# Patient Record
Sex: MALE | Race: WHITE | NOT HISPANIC OR LATINO | URBAN - METROPOLITAN AREA
[De-identification: names, ages, dates, MRNs, and addresses within clinical notes are randomized per-mention and may not be internally consistent; named-entity substitution may affect disease eponyms.]

---

## 2017-01-10 PROBLEM — Z00.00 ENCOUNTER FOR PREVENTIVE HEALTH EXAMINATION: Status: ACTIVE | Noted: 2017-01-10

## 2018-01-28 ENCOUNTER — EMERGENCY (EMERGENCY)
Facility: HOSPITAL | Age: 53
LOS: 1 days | End: 2018-01-28
Attending: EMERGENCY MEDICINE
Payer: COMMERCIAL

## 2018-01-28 VITALS
HEIGHT: 75 IN | SYSTOLIC BLOOD PRESSURE: 152 MMHG | OXYGEN SATURATION: 96 % | RESPIRATION RATE: 18 BRPM | HEART RATE: 69 BPM | DIASTOLIC BLOOD PRESSURE: 100 MMHG | WEIGHT: 285.06 LBS | TEMPERATURE: 98 F

## 2018-01-28 VITALS
TEMPERATURE: 98 F | HEART RATE: 68 BPM | DIASTOLIC BLOOD PRESSURE: 87 MMHG | SYSTOLIC BLOOD PRESSURE: 142 MMHG | OXYGEN SATURATION: 95 % | RESPIRATION RATE: 16 BRPM

## 2018-01-28 DIAGNOSIS — Z98.89 OTHER SPECIFIED POSTPROCEDURAL STATES: Chronic | ICD-10-CM

## 2018-01-28 DIAGNOSIS — Z96.659 PRESENCE OF UNSPECIFIED ARTIFICIAL KNEE JOINT: Chronic | ICD-10-CM

## 2018-01-28 PROCEDURE — 99283 EMERGENCY DEPT VISIT LOW MDM: CPT

## 2018-01-28 PROCEDURE — 99284 EMERGENCY DEPT VISIT MOD MDM: CPT

## 2018-01-28 RX ORDER — MUPIROCIN 20 MG/G
1 OINTMENT TOPICAL
Qty: 0 | Refills: 0 | COMMUNITY

## 2018-01-28 RX ORDER — VALACYCLOVIR 500 MG/1
1 TABLET, FILM COATED ORAL
Qty: 30 | Refills: 0 | OUTPATIENT
Start: 2018-01-28 | End: 2018-02-06

## 2018-01-28 RX ORDER — VALACYCLOVIR 500 MG/1
1000 TABLET, FILM COATED ORAL ONCE
Qty: 0 | Refills: 0 | Status: COMPLETED | OUTPATIENT
Start: 2018-01-28 | End: 2018-01-28

## 2018-01-28 RX ADMIN — VALACYCLOVIR 1000 MILLIGRAM(S): 500 TABLET, FILM COATED ORAL at 12:15

## 2018-01-28 NOTE — ED ADULT NURSE NOTE - OBJECTIVE STATEMENT
Pt is 52y M came to ED with c/o rash, blisters on the Right side of face, maxillary aspect, vesicle formation with some weeping, reports it began 5 days ago, denies fevers/chills, denies numbness/tingling, awaiting dispo, advised on plan of care, educated on use of antiviral medication.

## 2018-01-28 NOTE — ED PROVIDER NOTE - PMH
History of sleep apnea  not on cpap at present  Hyperlipidemia    Hypertension    KESHIA (obstructive sleep apnea)  mild-no cpap  Primary osteoarthritis of right knee

## 2018-01-28 NOTE — ED PROVIDER NOTE - PSH
History of right inguinal hernia  x2  S/P knee replacement  12/21/2015  S/P knee surgery  arthroscopy both knees, right knee 3x  ,left knee 3x  S/P UVPP (uvulopalatopharyngoplasty)

## 2018-01-28 NOTE — ED PROVIDER NOTE - MEDICAL DECISION MAKING DETAILS
Facial vesicular eruption now with eye irritation requiring evaluation, meds and Opthalmology consultation

## 2018-01-28 NOTE — ED PROVIDER NOTE - PROGRESS NOTE DETAILS
Case d/w O/C physician for Dr. Saavedra and wants Valtrex P.O only and will see patient tomorrow in office

## 2018-01-28 NOTE — ED PROVIDER NOTE - OBJECTIVE STATEMENT
51 yo white male presents today with redness and irritation to right eye. Patient states that over the past few days he has developed weeping lesions/vesicles to right cheek/nose area and was seen at [facility] office yesterday. Oral AUGMENTIN and Topical Bactroban was given. Eye irritation has worsened so presents today for evaluation. No blurred or altered vision and no headache/nausea or vomiting.

## 2020-02-12 ENCOUNTER — APPOINTMENT (OUTPATIENT)
Dept: CARDIOLOGY | Facility: CLINIC | Age: 55
End: 2020-02-12
Payer: COMMERCIAL

## 2020-02-12 ENCOUNTER — NON-APPOINTMENT (OUTPATIENT)
Age: 55
End: 2020-02-12

## 2020-02-12 VITALS
WEIGHT: 297 LBS | OXYGEN SATURATION: 94 % | BODY MASS INDEX: 36.93 KG/M2 | DIASTOLIC BLOOD PRESSURE: 83 MMHG | HEIGHT: 75 IN | HEART RATE: 81 BPM | SYSTOLIC BLOOD PRESSURE: 155 MMHG

## 2020-02-12 DIAGNOSIS — Z78.9 OTHER SPECIFIED HEALTH STATUS: ICD-10-CM

## 2020-02-12 DIAGNOSIS — R73.03 PREDIABETES.: ICD-10-CM

## 2020-02-12 DIAGNOSIS — R06.02 SHORTNESS OF BREATH: ICD-10-CM

## 2020-02-12 PROCEDURE — 93000 ELECTROCARDIOGRAM COMPLETE: CPT

## 2020-02-12 PROCEDURE — 99244 OFF/OP CNSLTJ NEW/EST MOD 40: CPT

## 2020-02-12 NOTE — PHYSICAL EXAM
[Normal Appearance] : normal appearance [General Appearance - Well Developed] : well developed [Well Groomed] : well groomed [General Appearance - Well Nourished] : well nourished [No Deformities] : no deformities [General Appearance - In No Acute Distress] : no acute distress [FreeTextEntry1] : overweight [Normal Conjunctiva] : the conjunctiva exhibited no abnormalities [Eyelids - No Xanthelasma] : the eyelids demonstrated no xanthelasmas [Normal Oral Mucosa] : normal oral mucosa [No Oral Pallor] : no oral pallor [No Oral Cyanosis] : no oral cyanosis [Normal Jugular Venous A Waves Present] : normal jugular venous A waves present [No Jugular Venous Murguia A Waves] : no jugular venous murguia A waves [Normal Jugular Venous V Waves Present] : normal jugular venous V waves present [Normal Rate] : normal [Normal S1] : normal S1 [S3] : no S3 [Normal S2] : normal S2 [S4] : no S4 [No Murmur] : no murmurs heard [Right Carotid Bruit] : no bruit heard over the right carotid [Left Carotid Bruit] : no bruit heard over the left carotid [Right Femoral Bruit] : no bruit heard over the right femoral artery [Left Femoral Bruit] : no bruit heard over the left femoral artery [2+] : left 2+ [No Abnormalities] : the abdominal aorta was not enlarged and no bruit was heard [No Pitting Edema] : no pitting edema present [Respiration, Rhythm And Depth] : normal respiratory rhythm and effort [Exaggerated Use Of Accessory Muscles For Inspiration] : no accessory muscle use [Auscultation Breath Sounds / Voice Sounds] : lungs were clear to auscultation bilaterally [Abdomen Soft] : soft [Abdomen Tenderness] : non-tender [Abdomen Mass (___ Cm)] : no abdominal mass palpated [Abnormal Walk] : normal gait [Gait - Sufficient For Exercise Testing] : the gait was sufficient for exercise testing [Nail Clubbing] : no clubbing of the fingernails [Cyanosis, Localized] : no localized cyanosis [Petechial Hemorrhages (___cm)] : no petechial hemorrhages [Skin Color & Pigmentation] : normal skin color and pigmentation [No Venous Stasis] : no venous stasis [] : no rash [No Skin Ulcers] : no skin ulcer [Skin Lesions] : no skin lesions [No Xanthoma] : no  xanthoma was observed [Affect] : the affect was normal [Oriented To Time, Place, And Person] : oriented to person, place, and time [No Anxiety] : not feeling anxious [Mood] : the mood was normal

## 2020-02-12 NOTE — DISCUSSION/SUMMARY
[FreeTextEntry1] : Neal is a 54 year old male here for evaluation.\par \par He reports 2 episodes of dizziness and difficulty catching his breath. His blood pressure is elevated today, and it may be responsible for his symptoms. His EKG demonstrates a sinus rhythm without obvious ischemia or chamber enlargement. His physical exam is unremarkable.\par \par In the setting of his symptoms, he will have a 2-D echocardiogram to evaluate for structural heart disease, and an exercise stress test to evaluate for exercise-induced ischemia, and his blood pressure response to exercise. If his blood pressures remain in this range, he will likely need pharmacologic treatment. I stressed the importance of diet, exercise, and weight loss, to reduce his overall cardiovascular risk. We will speak after the above tests, and arrange followup. [With Me] : with me

## 2020-02-12 NOTE — HISTORY OF PRESENT ILLNESS
[FreeTextEntry1] : Neal is a 54 year old male here for evaluation.\par \par He is generally healthy, though reports borderline blood pressures in the recent past. He works as a , and is not exceptionally active. That being said, he does exercise, and denies a decrease in exercise tolerance.\par \par He reports 2 episodes of dizziness, and difficulty catching his breath. One occurred on the football field, and another at home. He felt wobbly, and dizzy, though did not lose consciousness. He had no chest pain or significant palpitations during the episodes.\par \par He currently takes Saxenda of weight loss and borderline DM2. He is also on an SSRI for mild depression.

## 2020-02-24 ENCOUNTER — APPOINTMENT (OUTPATIENT)
Dept: CARDIOLOGY | Facility: CLINIC | Age: 55
End: 2020-02-24
Payer: COMMERCIAL

## 2020-02-24 PROCEDURE — 93306 TTE W/DOPPLER COMPLETE: CPT

## 2020-04-09 ENCOUNTER — APPOINTMENT (OUTPATIENT)
Dept: CARDIOLOGY | Facility: CLINIC | Age: 55
End: 2020-04-09

## 2020-08-03 ENCOUNTER — APPOINTMENT (OUTPATIENT)
Dept: CARDIOLOGY | Facility: CLINIC | Age: 55
End: 2020-08-03

## 2020-11-02 ENCOUNTER — APPOINTMENT (OUTPATIENT)
Dept: PSYCHIATRY | Facility: CLINIC | Age: 55
End: 2020-11-02
Payer: COMMERCIAL

## 2020-11-02 DIAGNOSIS — F41.9 ANXIETY DISORDER, UNSPECIFIED: ICD-10-CM

## 2020-11-02 DIAGNOSIS — Z86.79 PERSONAL HISTORY OF OTHER DISEASES OF THE CIRCULATORY SYSTEM: ICD-10-CM

## 2020-11-02 DIAGNOSIS — F43.10 POST-TRAUMATIC STRESS DISORDER, UNSPECIFIED: ICD-10-CM

## 2020-11-02 DIAGNOSIS — F32.9 MAJOR DEPRESSIVE DISORDER, SINGLE EPISODE, UNSPECIFIED: ICD-10-CM

## 2020-11-02 DIAGNOSIS — Z86.39 PERSONAL HISTORY OF OTHER ENDOCRINE, NUTRITIONAL AND METABOLIC DISEASE: ICD-10-CM

## 2020-11-02 PROCEDURE — 99072 ADDL SUPL MATRL&STAF TM PHE: CPT

## 2020-11-02 PROCEDURE — 99205 OFFICE O/P NEW HI 60 MIN: CPT

## 2020-11-02 RX ORDER — ATORVASTATIN CALCIUM 20 MG/1
20 TABLET, FILM COATED ORAL
Qty: 30 | Refills: 0 | Status: ACTIVE | COMMUNITY
Start: 2020-08-05

## 2020-11-02 RX ORDER — METFORMIN HYDROCHLORIDE 500 MG/1
500 TABLET, COATED ORAL
Qty: 90 | Refills: 0 | Status: DISCONTINUED | COMMUNITY
Start: 2020-08-11

## 2020-11-02 RX ORDER — LIRAGLUTIDE 6 MG/ML
18 INJECTION, SOLUTION SUBCUTANEOUS
Refills: 0 | Status: COMPLETED | COMMUNITY
End: 2020-11-02

## 2020-11-02 RX ORDER — SERTRALINE HYDROCHLORIDE 100 MG/1
100 TABLET, FILM COATED ORAL
Qty: 90 | Refills: 0 | Status: DISCONTINUED | COMMUNITY
Start: 2020-08-05

## 2020-12-02 ENCOUNTER — APPOINTMENT (OUTPATIENT)
Dept: PSYCHIATRY | Facility: CLINIC | Age: 55
End: 2020-12-02
Payer: COMMERCIAL

## 2020-12-02 PROCEDURE — 99214 OFFICE O/P EST MOD 30 MIN: CPT | Mod: 95

## 2021-01-15 ENCOUNTER — APPOINTMENT (OUTPATIENT)
Dept: PSYCHIATRY | Facility: CLINIC | Age: 56
End: 2021-01-15
Payer: COMMERCIAL

## 2021-01-15 PROCEDURE — 99214 OFFICE O/P EST MOD 30 MIN: CPT | Mod: 95

## 2021-01-15 RX ORDER — BUPROPION HYDROCHLORIDE 75 MG/1
75 TABLET, FILM COATED ORAL
Qty: 10 | Refills: 0 | Status: COMPLETED | COMMUNITY
Start: 2020-12-02 | End: 2021-01-15

## 2021-02-26 ENCOUNTER — APPOINTMENT (OUTPATIENT)
Dept: PSYCHIATRY | Facility: CLINIC | Age: 56
End: 2021-02-26
Payer: COMMERCIAL

## 2021-02-26 PROCEDURE — 99214 OFFICE O/P EST MOD 30 MIN: CPT | Mod: 95

## 2021-04-18 ENCOUNTER — TRANSCRIPTION ENCOUNTER (OUTPATIENT)
Age: 56
End: 2021-04-18

## 2021-05-21 ENCOUNTER — RX RENEWAL (OUTPATIENT)
Age: 56
End: 2021-05-21

## 2021-06-23 ENCOUNTER — APPOINTMENT (OUTPATIENT)
Dept: PSYCHIATRY | Facility: CLINIC | Age: 56
End: 2021-06-23
Payer: COMMERCIAL

## 2021-06-23 PROCEDURE — 99214 OFFICE O/P EST MOD 30 MIN: CPT | Mod: 95

## 2021-06-23 RX ORDER — SERTRALINE 25 MG/1
25 TABLET, FILM COATED ORAL
Qty: 90 | Refills: 0 | Status: ACTIVE | COMMUNITY
Start: 2020-11-02 | End: 1900-01-01

## 2021-06-23 RX ORDER — SERTRALINE HYDROCHLORIDE 100 MG/1
100 TABLET, FILM COATED ORAL DAILY
Qty: 90 | Refills: 0 | Status: ACTIVE | COMMUNITY
Start: 2020-11-02 | End: 1900-01-01

## 2021-06-23 RX ORDER — BUPROPION HYDROCHLORIDE 150 MG/1
150 TABLET, FILM COATED, EXTENDED RELEASE ORAL DAILY
Qty: 90 | Refills: 0 | Status: ACTIVE | COMMUNITY
Start: 2021-01-15 | End: 1900-01-01

## 2021-10-19 ENCOUNTER — APPOINTMENT (OUTPATIENT)
Dept: SURGERY | Facility: CLINIC | Age: 56
End: 2021-10-19
Payer: COMMERCIAL

## 2021-10-19 DIAGNOSIS — M62.08 SEPARATION OF MUSCLE (NONTRAUMATIC), OTHER SITE: ICD-10-CM

## 2021-10-19 PROCEDURE — 99202 OFFICE O/P NEW SF 15 MIN: CPT

## 2021-10-22 PROBLEM — M62.08 RECTUS DIASTASIS: Status: ACTIVE | Noted: 2021-10-22

## 2021-10-22 NOTE — HISTORY OF PRESENT ILLNESS
[de-identified] : Presents for evaluation of epigastric abdominal bulging.  Referred by his primary care physician to rule out hernia.  Patient denies obstructive symptoms.  He notices the upper abdomen bulges particularly with exertion.  Employed as a .

## 2021-10-22 NOTE — PHYSICAL EXAM
[Normal Breath Sounds] : Normal breath sounds [Normal Heart Sounds] : normal heart sounds [Normal Rate and Rhythm] : normal rate and rhythm [No Rash or Lesion] : No rash or lesion [Alert] : alert [Oriented to Person] : oriented to person [Oriented to Place] : oriented to place [Oriented to Time] : oriented to time [Calm] : calm [de-identified] : Obese gentleman in no acute distress [de-identified] : SVETLANA PONCE, TAMIA [de-identified] : Soft, nontender nondistended, positive bowel sounds in all four quads.  No hernia or masses. No rebound or guarding.  Epigastric diastases rectus. [de-identified] : Ambulating without difficulty or assistance

## 2021-10-22 NOTE — ASSESSMENT
[FreeTextEntry1] : 55-year-old gentleman with a large protuberant abdomen.  Physical exam reveals a large diastases rectus of the epigastrium extending from the xiphoid process to the level of the umbilicus.  No discrete hernias or masses are appreciated.\par \par No surgical intervention is indicated at this time.  In order to correct this diastases the patient would need to see a plastic surgeon to undergo an abdominal wall reconstruction/abdominal plasty.\par \par Core strengthening exercises such as planks of Pilates and often sit ups can help strengthen the rectus abdominis musculature and improve the appearance of the diastases. \par \par Follow-up as needed\par

## 2022-01-24 NOTE — ED ADULT NURSE NOTE - NS ED NURSE RECORD ANOTHER HT AND WT
From: Dony Abdul  To: Natasha Castillo  Sent: 1/24/2022 12:14 PM CST  Subject: Doctors note    Can you provide me a letter stating that I am able to go back to work. My job requires it for me. Thank you in advance!   
Letter printed, please let patient know to pick it up.    Natasha Castillo, DO  1/24/2022    
Yes

## 2022-04-04 ENCOUNTER — APPOINTMENT (OUTPATIENT)
Dept: ORTHOPEDIC SURGERY | Facility: CLINIC | Age: 57
End: 2022-04-04
Payer: COMMERCIAL

## 2022-04-04 VITALS — HEIGHT: 75 IN | WEIGHT: 280 LBS | BODY MASS INDEX: 34.82 KG/M2

## 2022-04-04 DIAGNOSIS — M25.561 PAIN IN RIGHT KNEE: ICD-10-CM

## 2022-04-04 DIAGNOSIS — Z96.659 BROKEN INTERNAL JOINT PROSTHESIS, OTHER SITE, INITIAL ENCOUNTER: ICD-10-CM

## 2022-04-04 DIAGNOSIS — E11.9 TYPE 2 DIABETES MELLITUS W/OUT COMPLICATIONS: ICD-10-CM

## 2022-04-04 DIAGNOSIS — E78.00 PURE HYPERCHOLESTEROLEMIA, UNSPECIFIED: ICD-10-CM

## 2022-04-04 DIAGNOSIS — I10 ESSENTIAL (PRIMARY) HYPERTENSION: ICD-10-CM

## 2022-04-04 DIAGNOSIS — T84.018A BROKEN INTERNAL JOINT PROSTHESIS, OTHER SITE, INITIAL ENCOUNTER: ICD-10-CM

## 2022-04-04 DIAGNOSIS — M25.562 PAIN IN RIGHT KNEE: ICD-10-CM

## 2022-04-04 PROBLEM — Z00.00 ENCOUNTER FOR PREVENTIVE HEALTH EXAMINATION: Status: ACTIVE | Noted: 2022-04-04

## 2022-04-04 PROCEDURE — 20610 DRAIN/INJ JOINT/BURSA W/O US: CPT | Mod: LT

## 2022-04-04 PROCEDURE — 73562 X-RAY EXAM OF KNEE 3: CPT | Mod: RT

## 2022-04-04 PROCEDURE — 99203 OFFICE O/P NEW LOW 30 MIN: CPT | Mod: 25

## 2022-05-19 ENCOUNTER — APPOINTMENT (OUTPATIENT)
Dept: ORTHOPEDIC SURGERY | Facility: CLINIC | Age: 57
End: 2022-05-19

## 2022-09-30 NOTE — REVIEW OF SYSTEMS
[Negative] : Heme/Lymph Doxycycline Counseling:  Patient counseled regarding possible photosensitivity and increased risk for sunburn.  Patient instructed to avoid sunlight, if possible.  When exposed to sunlight, patients should wear protective clothing, sunglasses, and sunscreen.  The patient was instructed to call the office immediately if the following severe adverse effects occur:  hearing changes, easy bruising/bleeding, severe headache, or vision changes.  The patient verbalized understanding of the proper use and possible adverse effects of doxycycline.  All of the patient's questions and concerns were addressed.

## 2023-11-10 ENCOUNTER — APPOINTMENT (OUTPATIENT)
Dept: ORTHOPEDIC SURGERY | Facility: CLINIC | Age: 58
End: 2023-11-10
Payer: COMMERCIAL

## 2023-11-10 VITALS — WEIGHT: 280 LBS | HEIGHT: 75 IN | BODY MASS INDEX: 34.82 KG/M2

## 2023-11-10 DIAGNOSIS — M17.12 UNILATERAL PRIMARY OSTEOARTHRITIS, LEFT KNEE: ICD-10-CM

## 2023-11-10 PROCEDURE — 99203 OFFICE O/P NEW LOW 30 MIN: CPT | Mod: 25

## 2023-11-10 PROCEDURE — 20610 DRAIN/INJ JOINT/BURSA W/O US: CPT | Mod: LT

## 2023-11-10 PROCEDURE — 73562 X-RAY EXAM OF KNEE 3: CPT | Mod: LT

## 2023-11-10 RX ORDER — METHYLPREDNISOLONE ACETATE 40 MG/ML
40 INJECTION, SUSPENSION INTRA-ARTICULAR; INTRALESIONAL; INTRAMUSCULAR; SOFT TISSUE
Qty: 1 | Refills: 0 | Status: COMPLETED | OUTPATIENT
Start: 2023-11-10